# Patient Record
Sex: MALE | Race: OTHER | NOT HISPANIC OR LATINO | URBAN - METROPOLITAN AREA
[De-identification: names, ages, dates, MRNs, and addresses within clinical notes are randomized per-mention and may not be internally consistent; named-entity substitution may affect disease eponyms.]

---

## 2023-12-11 VITALS — WEIGHT: 130.07 LBS

## 2023-12-11 RX ORDER — CHLORHEXIDINE GLUCONATE 213 G/1000ML
1 SOLUTION TOPICAL ONCE
Refills: 0 | Status: DISCONTINUED | OUTPATIENT
Start: 2023-12-13 | End: 2023-12-27

## 2023-12-11 NOTE — H&P ADULT - HISTORY OF PRESENT ILLNESS
Pt is a 58yoM PMHx HTN, HLD who presented to outpatient Cardiologist Dr. Flannery with chief complaint of recurrent recent onset episodes of palpitations described as "his heart fluttering" that has occcured during rest. Symptoms have been persistent over the last few weeks and he also reports exertional shortness of breath at times but otherwise denies any significant chest pain, orthopnea, PND, dizziness, LOC or claudications. Pt currently denies ____ chest pain, SOB, PND, orthopnea, syncope, presyncope, abdominal pain, leg pain and leg swelling. In light of pts risk factors, CCS class __ anginal symptoms and abnormal ____, pt now presents to [facility] for recommended cardiac catheterization with possible intervention if clinically indicated.     Echo: normal LVEF, unremarkable per note  Stress EKG (12/04/23): abnormal maximal exercise stress EKG with evidence of inducible myocardial ischemia, hypertensive response to exercise stress.  Pt is a 58yoM PMHx HTN, HLD who presented to outpatient Cardiologist Dr. Flannery with chief complaint of recurrent recent onset episodes of palpitations described as "his heart fluttering" that has occcured during rest. Symptoms have been persistent over the last few weeks and he also reports exertional shortness of breath at times but otherwise denies any significant chest pain, orthopnea, PND, dizziness, LOC or claudications. Pt currently denies ____ chest pain, SOB, PND, orthopnea, syncope, presyncope, abdominal pain, leg pain and leg swelling. In light of pts risk factors, CCS class __ anginal symptoms and abnormal ____, pt now presents to [facility] for recommended cardiac catheterization with possible intervention if clinically indicated.     Echo: normal LVEF, unremarkable per note  Stress EKG (12/04/23): abnormal maximal exercise stress EKG with evidence of inducible myocardial ischemia, hypertensive response to exercise stress.  Cardiologist: Dr. Flannery  Pharmacy:  [facility]:    Pt is a 58yoM PMHx HTN, HLD who presented to outpatient Cardiologist Dr. Flannery with chief complaint of recurrent recent onset episodes of palpitations described as "his heart fluttering" that has occcured during rest. Symptoms have been persistent over the last few weeks and he also reports exertional shortness of breath at times but otherwise denies any significant chest pain, orthopnea, PND, dizziness, LOC or claudications. Pt currently denies ____ chest pain, SOB, PND, orthopnea, syncope, presyncope, abdominal pain, leg pain and leg swelling. In light of pts risk factors, CCS class __ anginal symptoms and abnormal ____, pt now presents to [facility] for recommended cardiac catheterization with possible intervention if clinically indicated.     Echo: normal LVEF, unremarkable per note  Stress EKG (12/04/23): abnormal maximal exercise stress EKG with evidence of inducible myocardial ischemia, hypertensive response to exercise stress.  Cardiologist: Dr. Flannery  Pharmacy:  [facility]:    Pt is a 58yoM PMHx HTN, HLD who presented to outpatient Cardiologist Dr. Flannery with chief complaint of recurrent recent onset episodes of palpitations described as "his heart fluttering" that has occcured during rest. Symptoms have been persistent over the last few weeks and he also reports exertional shortness of breath at times but otherwise denies any significant chest pain, orthopnea, PND, dizziness, LOC or claudications. Pt currently denies ____ chest pain, SOB, PND, orthopnea, syncope, presyncope, abdominal pain, leg pain and leg swelling. In light of pts risk factors, CCS class __ anginal symptoms and abnormal ____, pt now presents to [facility] for recommended cardiac catheterization with possible intervention if clinically indicated.     Echo: normal LVEF, unremarkable per note  Stress EKG (12/04/23): abnormal maximal exercise stress EKG with evidence of inducible myocardial ischemia, hypertensive response to exercise stress.  Cardiologist: Dr. Flannery  Pharmacy:     Pt is a 58yoM PMHx HTN, HLD who presented to outpatient Cardiologist Dr. Flannery with chief complaint of recurrent recent onset episodes of palpitations described as "his heart fluttering" that has occurred during rest. Symptoms have been persistent over the last few weeks and he also reports exertional shortness of breath at times but otherwise denies any significant chest pain, orthopnea, PND, dizziness, LOC or claudications. Pt currently denies chest pain, SOB, PND, orthopnea, syncope, presyncope, abdominal pain, leg pain and leg swelling. In light of pts risk factors, CCS class II anginal symptoms and abnormal stress test , pt now presents to [facility] for recommended cardiac catheterization with possible intervention if clinically indicated.     Echo: normal LVEF, unremarkable per note  Stress EKG (12/04/23): abnormal maximal exercise stress EKG with evidence of inducible myocardial ischemia, hypertensive response to exercise stress.  Cardiologist: Dr. Flannery  Pharmacy:     Pt is a 58yoM PMHx HTN, HLD who presented to outpatient Cardiologist Dr. Flannery with chief complaint of recurrent recent onset episodes of palpitations described as "his heart fluttering" that has occurred during rest. Symptoms have been persistent over the last few weeks and he also reports exertional shortness of breath at times but otherwise denies any significant chest pain, orthopnea, PND, dizziness, LOC or claudications. Pt currently denies chest pain, SOB, PND, orthopnea, syncope, presyncope, abdominal pain, leg pain and leg swelling. In light of pts risk factors, CCS class II anginal symptoms and abnormal stress test , pt now presents to [facility] for recommended cardiac catheterization with possible intervention if clinically indicated.     Echo: normal LVEF, unremarkable per note  Stress EKG (12/04/23): abnormal maximal exercise stress EKG with evidence of inducible myocardial ischemia, hypertensive response to exercise stress.  Cardiologist: Dr. Solis  Pharmacy: [facility]    Pt is a 58yoM PMHx HTN, HLD who presented to outpatient Cardiologist Dr. Solis with chief complaint of recurrent recent onset episodes of palpitations described as "his heart fluttering" that has occurred during rest. Symptoms have been persistent over the last few weeks and he also reports exertional shortness of breath at times but otherwise denies any significant chest pain, orthopnea, PND, dizziness, LOC or claudications. Pt currently denies chest pain, SOB, PND, orthopnea, syncope, presyncope, abdominal pain, leg pain and leg swelling. In light of pts risk factors, CCS class II anginal symptoms and abnormal stress test , pt now presents to [facility] for recommended cardiac catheterization with possible intervention if clinically indicated.     Echo: normal LVEF, unremarkable per note  Stress EKG (12/04/23): abnormal maximal exercise stress EKG with evidence of inducible myocardial ischemia, hypertensive response to exercise stress.  Cardiologist: Dr. Solis  Pharmacy: [facility]    Pt is a 58yoM PMHx HTN, HLD who presented to outpatient Cardiologist Dr. Solis with chief complaint of recurrent recent onset episodes of palpitations described as "his heart fluttering" that has occurred during rest. Symptoms have been persistent over the last few weeks and he also reports exertional shortness of breath at times but otherwise denies any significant chest pain, orthopnea, PND, dizziness, LOC or claudications. Pt currently denies chest pain, SOB, PND, orthopnea, syncope, presyncope, abdominal pain, leg pain and leg swelling. In light of pts risk factors, CCS class II anginal symptoms and abnormal stress test , pt now presents to [facility] for recommended cardiac catheterization with possible intervention if clinically indicated.     Echo: normal LVEF, unremarkable per note  Stress EKG (12/04/23): abnormal maximal exercise stress EKG with evidence of inducible myocardial ischemia, hypertensive response to exercise stress.

## 2023-12-11 NOTE — H&P ADULT - ASSESSMENT
Pt is a 58yoM PMHx HTN, HLD who presented to outpatient Cardiologist Dr. Solis with chief complaint of recurrent recent onset episodes of palpitations described as "his heart fluttering" that has occurred during rest. Symptoms have been persistent over the last few weeks and he also reports exertional shortness of breath at times but otherwise denies any significant chest pain, orthopnea, PND, dizziness, LOC or claudications. Pt currently denies chest pain, SOB, PND, orthopnea, syncope, presyncope, abdominal pain, leg pain and leg swelling. In light of pts risk factors, CCS class II anginal symptoms and abnormal stress test , pt now presents to [facility] for recommended cardiac catheterization with possible intervention if clinically indicated.   Echo: normal LVEF, unremarkable per note  Stress EKG (12/04/23): abnormal maximal exercise stress EKG with evidence of inducible myocardial ischemia, hypertensive response to exercise stress.     - ASA II; Mallampati I.   - VSS.   - Pt is a candidate for moderate sedation.   - LOAD: ASA 325mg PO x1 and Plavix 600mg PO x1;   - IVF: NS 250cc IV bolus x2 then NS 75cc/hr x2hrs.     Risks & benefits of procedure and alternative therapy have been explained to the patient including but not limited to: allergic reaction, bleeding w/possible need for blood transfusion, infection, renal and vascular compromise, limb damage, arrhythmia, stroke, vessel dissection/perforation, Myocardial infarction, emergent CABG. Informed consent obtained and in chart.    Pt is a 58yoM PMHx HTN, HLD who presented to outpatient Cardiologist Dr. Solis with chief complaint of recurrent recent onset episodes of palpitations described as "his heart fluttering" that has occurred during rest. Symptoms have been persistent over the last few weeks and he also reports exertional shortness of breath at times but otherwise denies any significant chest pain, orthopnea, PND, dizziness, LOC or claudications. Pt currently denies chest pain, SOB, PND, orthopnea, syncope, presyncope, abdominal pain, leg pain and leg swelling. In light of pts risk factors, CCS class II anginal symptoms and abnormal stress test , pt now presents to [facility] for recommended cardiac catheterization with possible intervention if clinically indicated.   Echo: normal LVEF, unremarkable per note  Stress EKG (12/04/23): abnormal maximal exercise stress EKG with evidence of inducible myocardial ischemia, hypertensive response to exercise stress.     - ASA II; Mallampati I.   - VSS.   - Pt is a candidate for moderate sedation.   - LOAD: ASA 325mg PO x1 and Plavix 600mg PO x1;   - IVF: NS 250cc IV bolus x2 then NS 75cc/hr x2hrs.     Risks & benefits of procedure and alternative therapy have been explained to the patient including but not limited to: allergic reaction, bleeding w/possible need for blood transfusion, infection, renal and vascular compromise, limb damage, arrhythmia, stroke, vessel dissection/perforation, Myocardial infarction, emergent CABG. Informed consent obtained and in chart.

## 2023-12-11 NOTE — H&P ADULT - NSHPLABSRESULTS_GEN_ALL_CORE
14.3   7.77  )-----------( 276      ( 13 Dec 2023 07:07 )             42.2     Mg     2.2     12-13    PT/INR - ( 13 Dec 2023 07:07 )   PT: 10.4 sec;   INR: 0.91        PTT - ( 13 Dec 2023 07:07 )  PTT:29.3 sec    EKG: NSR with no ischemic changes

## 2023-12-13 ENCOUNTER — OUTPATIENT (OUTPATIENT)
Dept: OUTPATIENT SERVICES | Facility: HOSPITAL | Age: 58
LOS: 1 days | Discharge: HOME | End: 2023-12-13
Payer: MEDICAID

## 2023-12-13 LAB
A1C WITH ESTIMATED AVERAGE GLUCOSE RESULT: 5.7 % — HIGH (ref 4–5.6)
A1C WITH ESTIMATED AVERAGE GLUCOSE RESULT: 5.7 % — HIGH (ref 4–5.6)
ALBUMIN SERPL ELPH-MCNC: 4.8 G/DL — SIGNIFICANT CHANGE UP (ref 3.3–5)
ALBUMIN SERPL ELPH-MCNC: 4.8 G/DL — SIGNIFICANT CHANGE UP (ref 3.3–5)
ALP SERPL-CCNC: 82 U/L — SIGNIFICANT CHANGE UP (ref 40–120)
ALP SERPL-CCNC: 82 U/L — SIGNIFICANT CHANGE UP (ref 40–120)
ALT FLD-CCNC: 21 U/L — SIGNIFICANT CHANGE UP (ref 10–45)
ALT FLD-CCNC: 21 U/L — SIGNIFICANT CHANGE UP (ref 10–45)
ANION GAP SERPL CALC-SCNC: 12 MMOL/L — SIGNIFICANT CHANGE UP (ref 5–17)
ANION GAP SERPL CALC-SCNC: 12 MMOL/L — SIGNIFICANT CHANGE UP (ref 5–17)
APTT BLD: 29.3 SEC — SIGNIFICANT CHANGE UP (ref 24.5–35.6)
APTT BLD: 29.3 SEC — SIGNIFICANT CHANGE UP (ref 24.5–35.6)
AST SERPL-CCNC: 22 U/L — SIGNIFICANT CHANGE UP (ref 10–40)
AST SERPL-CCNC: 22 U/L — SIGNIFICANT CHANGE UP (ref 10–40)
BASE EXCESS BLDV CALC-SCNC: 3.1 MMOL/L — HIGH (ref -2–3)
BASE EXCESS BLDV CALC-SCNC: 3.1 MMOL/L — HIGH (ref -2–3)
BASOPHILS # BLD AUTO: 0.05 K/UL — SIGNIFICANT CHANGE UP (ref 0–0.2)
BASOPHILS # BLD AUTO: 0.05 K/UL — SIGNIFICANT CHANGE UP (ref 0–0.2)
BASOPHILS NFR BLD AUTO: 0.6 % — SIGNIFICANT CHANGE UP (ref 0–2)
BASOPHILS NFR BLD AUTO: 0.6 % — SIGNIFICANT CHANGE UP (ref 0–2)
BILIRUB SERPL-MCNC: 0.4 MG/DL — SIGNIFICANT CHANGE UP (ref 0.2–1.2)
BILIRUB SERPL-MCNC: 0.4 MG/DL — SIGNIFICANT CHANGE UP (ref 0.2–1.2)
BLOOD GAS VENOUS - BLOOD UREA NITROGEN: 20 MG/DL — SIGNIFICANT CHANGE UP (ref 7–23)
BLOOD GAS VENOUS - BLOOD UREA NITROGEN: 20 MG/DL — SIGNIFICANT CHANGE UP (ref 7–23)
BLOOD GAS VENOUS - CREATININE: 1.6 MG/DL — HIGH (ref 0.5–1.3)
BLOOD GAS VENOUS - CREATININE: 1.6 MG/DL — HIGH (ref 0.5–1.3)
BUN SERPL-MCNC: 17 MG/DL — SIGNIFICANT CHANGE UP (ref 7–23)
BUN SERPL-MCNC: 17 MG/DL — SIGNIFICANT CHANGE UP (ref 7–23)
CA-I SERPL-SCNC: 1.19 MMOL/L — SIGNIFICANT CHANGE UP (ref 1.15–1.33)
CA-I SERPL-SCNC: 1.19 MMOL/L — SIGNIFICANT CHANGE UP (ref 1.15–1.33)
CA-I SERPL-SCNC: 1.2 MMOL/L — SIGNIFICANT CHANGE UP (ref 1.15–1.33)
CA-I SERPL-SCNC: 1.2 MMOL/L — SIGNIFICANT CHANGE UP (ref 1.15–1.33)
CALCIUM SERPL-MCNC: 9.9 MG/DL — SIGNIFICANT CHANGE UP (ref 8.4–10.5)
CALCIUM SERPL-MCNC: 9.9 MG/DL — SIGNIFICANT CHANGE UP (ref 8.4–10.5)
CHLORIDE BLDV-SCNC: 107 MMOL/L — SIGNIFICANT CHANGE UP (ref 96–108)
CHLORIDE BLDV-SCNC: 107 MMOL/L — SIGNIFICANT CHANGE UP (ref 96–108)
CHLORIDE SERPL-SCNC: 102 MMOL/L — SIGNIFICANT CHANGE UP (ref 96–108)
CHLORIDE SERPL-SCNC: 102 MMOL/L — SIGNIFICANT CHANGE UP (ref 96–108)
CHOLEST SERPL-MCNC: 287 MG/DL — HIGH
CHOLEST SERPL-MCNC: 287 MG/DL — HIGH
CO2 BLDV-SCNC: 26.1 MMOL/L — HIGH (ref 22–26)
CO2 BLDV-SCNC: 26.1 MMOL/L — HIGH (ref 22–26)
CO2 BLDV-SCNC: 31.1 MMOL/L — HIGH (ref 22–26)
CO2 BLDV-SCNC: 31.1 MMOL/L — HIGH (ref 22–26)
CO2 SERPL-SCNC: 27 MMOL/L — SIGNIFICANT CHANGE UP (ref 22–31)
CO2 SERPL-SCNC: 27 MMOL/L — SIGNIFICANT CHANGE UP (ref 22–31)
CREAT SERPL-MCNC: 1.4 MG/DL — HIGH (ref 0.5–1.3)
CREAT SERPL-MCNC: 1.4 MG/DL — HIGH (ref 0.5–1.3)
EGFR: 58 ML/MIN/1.73M2 — LOW
EGFR: 58 ML/MIN/1.73M2 — LOW
EOSINOPHIL # BLD AUTO: 0.16 K/UL — SIGNIFICANT CHANGE UP (ref 0–0.5)
EOSINOPHIL # BLD AUTO: 0.16 K/UL — SIGNIFICANT CHANGE UP (ref 0–0.5)
EOSINOPHIL NFR BLD AUTO: 2.1 % — SIGNIFICANT CHANGE UP (ref 0–6)
EOSINOPHIL NFR BLD AUTO: 2.1 % — SIGNIFICANT CHANGE UP (ref 0–6)
ESTIMATED AVERAGE GLUCOSE: 117 MG/DL — HIGH (ref 68–114)
ESTIMATED AVERAGE GLUCOSE: 117 MG/DL — HIGH (ref 68–114)
GAS PNL BLDV: 139 MMOL/L — SIGNIFICANT CHANGE UP (ref 136–145)
GAS PNL BLDV: 139 MMOL/L — SIGNIFICANT CHANGE UP (ref 136–145)
GAS PNL BLDV: 140 MMOL/L — SIGNIFICANT CHANGE UP (ref 136–145)
GAS PNL BLDV: 140 MMOL/L — SIGNIFICANT CHANGE UP (ref 136–145)
GLUCOSE BLDV-MCNC: 103 MG/DL — HIGH (ref 70–99)
GLUCOSE SERPL-MCNC: 106 MG/DL — HIGH (ref 70–99)
GLUCOSE SERPL-MCNC: 106 MG/DL — HIGH (ref 70–99)
HCO3 BLDV-SCNC: 30 MMOL/L — HIGH (ref 22–29)
HCO3 BLDV-SCNC: 30 MMOL/L — HIGH (ref 22–29)
HCT VFR BLD CALC: 42.2 % — SIGNIFICANT CHANGE UP (ref 39–50)
HCT VFR BLD CALC: 42.2 % — SIGNIFICANT CHANGE UP (ref 39–50)
HCT VFR BLDA CALC: 43 % — SIGNIFICANT CHANGE UP
HCT VFR BLDA CALC: 43 % — SIGNIFICANT CHANGE UP
HDLC SERPL-MCNC: 59 MG/DL — SIGNIFICANT CHANGE UP
HDLC SERPL-MCNC: 59 MG/DL — SIGNIFICANT CHANGE UP
HGB BLD-MCNC: 14.3 G/DL — SIGNIFICANT CHANGE UP (ref 13–17)
HGB BLD-MCNC: 14.3 G/DL — SIGNIFICANT CHANGE UP (ref 13–17)
IMM GRANULOCYTES NFR BLD AUTO: 0.5 % — SIGNIFICANT CHANGE UP (ref 0–0.9)
IMM GRANULOCYTES NFR BLD AUTO: 0.5 % — SIGNIFICANT CHANGE UP (ref 0–0.9)
INR BLD: 0.91 — SIGNIFICANT CHANGE UP (ref 0.85–1.18)
INR BLD: 0.91 — SIGNIFICANT CHANGE UP (ref 0.85–1.18)
LACTATE BLDV-MCNC: 1.2 MMOL/L — SIGNIFICANT CHANGE UP (ref 0.5–2)
LACTATE BLDV-MCNC: 1.2 MMOL/L — SIGNIFICANT CHANGE UP (ref 0.5–2)
LIPID PNL WITH DIRECT LDL SERPL: 202 MG/DL — HIGH
LIPID PNL WITH DIRECT LDL SERPL: 202 MG/DL — HIGH
LYMPHOCYTES # BLD AUTO: 2.35 K/UL — SIGNIFICANT CHANGE UP (ref 1–3.3)
LYMPHOCYTES # BLD AUTO: 2.35 K/UL — SIGNIFICANT CHANGE UP (ref 1–3.3)
LYMPHOCYTES # BLD AUTO: 30.2 % — SIGNIFICANT CHANGE UP (ref 13–44)
LYMPHOCYTES # BLD AUTO: 30.2 % — SIGNIFICANT CHANGE UP (ref 13–44)
MAGNESIUM SERPL-MCNC: 2.2 MG/DL — SIGNIFICANT CHANGE UP (ref 1.6–2.6)
MAGNESIUM SERPL-MCNC: 2.2 MG/DL — SIGNIFICANT CHANGE UP (ref 1.6–2.6)
MCHC RBC-ENTMCNC: 29.7 PG — SIGNIFICANT CHANGE UP (ref 27–34)
MCHC RBC-ENTMCNC: 29.7 PG — SIGNIFICANT CHANGE UP (ref 27–34)
MCHC RBC-ENTMCNC: 33.9 GM/DL — SIGNIFICANT CHANGE UP (ref 32–36)
MCHC RBC-ENTMCNC: 33.9 GM/DL — SIGNIFICANT CHANGE UP (ref 32–36)
MCV RBC AUTO: 87.7 FL — SIGNIFICANT CHANGE UP (ref 80–100)
MCV RBC AUTO: 87.7 FL — SIGNIFICANT CHANGE UP (ref 80–100)
MONOCYTES # BLD AUTO: 0.53 K/UL — SIGNIFICANT CHANGE UP (ref 0–0.9)
MONOCYTES # BLD AUTO: 0.53 K/UL — SIGNIFICANT CHANGE UP (ref 0–0.9)
MONOCYTES NFR BLD AUTO: 6.8 % — SIGNIFICANT CHANGE UP (ref 2–14)
MONOCYTES NFR BLD AUTO: 6.8 % — SIGNIFICANT CHANGE UP (ref 2–14)
NEUTROPHILS # BLD AUTO: 4.64 K/UL — SIGNIFICANT CHANGE UP (ref 1.8–7.4)
NEUTROPHILS # BLD AUTO: 4.64 K/UL — SIGNIFICANT CHANGE UP (ref 1.8–7.4)
NEUTROPHILS NFR BLD AUTO: 59.8 % — SIGNIFICANT CHANGE UP (ref 43–77)
NEUTROPHILS NFR BLD AUTO: 59.8 % — SIGNIFICANT CHANGE UP (ref 43–77)
NON HDL CHOLESTEROL: 228 MG/DL — HIGH
NON HDL CHOLESTEROL: 228 MG/DL — HIGH
NRBC # BLD: 0 /100 WBCS — SIGNIFICANT CHANGE UP (ref 0–0)
NRBC # BLD: 0 /100 WBCS — SIGNIFICANT CHANGE UP (ref 0–0)
PCO2 BLDV: 51 MMHG — SIGNIFICANT CHANGE UP (ref 42–55)
PCO2 BLDV: 51 MMHG — SIGNIFICANT CHANGE UP (ref 42–55)
PCO2 BLDV: 52 MMHG — SIGNIFICANT CHANGE UP (ref 42–55)
PCO2 BLDV: 52 MMHG — SIGNIFICANT CHANGE UP (ref 42–55)
PH BLDV: 7.37 — SIGNIFICANT CHANGE UP (ref 7.32–7.43)
PLATELET # BLD AUTO: 276 K/UL — SIGNIFICANT CHANGE UP (ref 150–400)
PLATELET # BLD AUTO: 276 K/UL — SIGNIFICANT CHANGE UP (ref 150–400)
PO2 BLDV: 42 MMHG — SIGNIFICANT CHANGE UP (ref 25–45)
PO2 BLDV: 42 MMHG — SIGNIFICANT CHANGE UP (ref 25–45)
POTASSIUM BLDV-SCNC: 4.4 MMOL/L — SIGNIFICANT CHANGE UP (ref 3.5–5.1)
POTASSIUM BLDV-SCNC: 4.4 MMOL/L — SIGNIFICANT CHANGE UP (ref 3.5–5.1)
POTASSIUM BLDV-SCNC: 4.6 MMOL/L — SIGNIFICANT CHANGE UP (ref 3.5–5.1)
POTASSIUM BLDV-SCNC: 4.6 MMOL/L — SIGNIFICANT CHANGE UP (ref 3.5–5.1)
POTASSIUM SERPL-MCNC: 4.1 MMOL/L — SIGNIFICANT CHANGE UP (ref 3.5–5.3)
POTASSIUM SERPL-MCNC: 4.1 MMOL/L — SIGNIFICANT CHANGE UP (ref 3.5–5.3)
POTASSIUM SERPL-SCNC: 4.1 MMOL/L — SIGNIFICANT CHANGE UP (ref 3.5–5.3)
POTASSIUM SERPL-SCNC: 4.1 MMOL/L — SIGNIFICANT CHANGE UP (ref 3.5–5.3)
PROT SERPL-MCNC: 8.2 G/DL — SIGNIFICANT CHANGE UP (ref 6–8.3)
PROT SERPL-MCNC: 8.2 G/DL — SIGNIFICANT CHANGE UP (ref 6–8.3)
PROTHROM AB SERPL-ACNC: 10.4 SEC — SIGNIFICANT CHANGE UP (ref 9.5–13)
PROTHROM AB SERPL-ACNC: 10.4 SEC — SIGNIFICANT CHANGE UP (ref 9.5–13)
RBC # BLD: 4.81 M/UL — SIGNIFICANT CHANGE UP (ref 4.2–5.8)
RBC # BLD: 4.81 M/UL — SIGNIFICANT CHANGE UP (ref 4.2–5.8)
RBC # FLD: 11.9 % — SIGNIFICANT CHANGE UP (ref 10.3–14.5)
RBC # FLD: 11.9 % — SIGNIFICANT CHANGE UP (ref 10.3–14.5)
SODIUM SERPL-SCNC: 141 MMOL/L — SIGNIFICANT CHANGE UP (ref 135–145)
SODIUM SERPL-SCNC: 141 MMOL/L — SIGNIFICANT CHANGE UP (ref 135–145)
TRIGL SERPL-MCNC: 128 MG/DL — SIGNIFICANT CHANGE UP
TRIGL SERPL-MCNC: 128 MG/DL — SIGNIFICANT CHANGE UP
WBC # BLD: 7.77 K/UL — SIGNIFICANT CHANGE UP (ref 3.8–10.5)
WBC # BLD: 7.77 K/UL — SIGNIFICANT CHANGE UP (ref 3.8–10.5)
WBC # FLD AUTO: 7.77 K/UL — SIGNIFICANT CHANGE UP (ref 3.8–10.5)
WBC # FLD AUTO: 7.77 K/UL — SIGNIFICANT CHANGE UP (ref 3.8–10.5)

## 2023-12-13 PROCEDURE — 85610 PROTHROMBIN TIME: CPT

## 2023-12-13 PROCEDURE — 80061 LIPID PANEL: CPT

## 2023-12-13 PROCEDURE — C1769: CPT

## 2023-12-13 PROCEDURE — C1894: CPT

## 2023-12-13 PROCEDURE — 85025 COMPLETE CBC W/AUTO DIFF WBC: CPT

## 2023-12-13 PROCEDURE — 99152 MOD SED SAME PHYS/QHP 5/>YRS: CPT

## 2023-12-13 PROCEDURE — 93010 ELECTROCARDIOGRAM REPORT: CPT

## 2023-12-13 PROCEDURE — 80053 COMPREHEN METABOLIC PANEL: CPT

## 2023-12-13 PROCEDURE — 93005 ELECTROCARDIOGRAM TRACING: CPT

## 2023-12-13 PROCEDURE — 83735 ASSAY OF MAGNESIUM: CPT

## 2023-12-13 PROCEDURE — 85730 THROMBOPLASTIN TIME PARTIAL: CPT

## 2023-12-13 PROCEDURE — 83036 HEMOGLOBIN GLYCOSYLATED A1C: CPT

## 2023-12-13 PROCEDURE — C1887: CPT

## 2023-12-13 PROCEDURE — 36415 COLL VENOUS BLD VENIPUNCTURE: CPT

## 2023-12-13 PROCEDURE — 93458 L HRT ARTERY/VENTRICLE ANGIO: CPT

## 2023-12-13 PROCEDURE — 93458 L HRT ARTERY/VENTRICLE ANGIO: CPT | Mod: 26

## 2023-12-13 RX ORDER — ROSUVASTATIN CALCIUM 5 MG/1
1 TABLET ORAL
Refills: 0 | DISCHARGE

## 2023-12-13 RX ORDER — SODIUM CHLORIDE 9 MG/ML
250 INJECTION INTRAMUSCULAR; INTRAVENOUS; SUBCUTANEOUS ONCE
Refills: 0 | Status: DISCONTINUED | OUTPATIENT
Start: 2023-12-13 | End: 2023-12-13

## 2023-12-13 RX ORDER — ASPIRIN/CALCIUM CARB/MAGNESIUM 324 MG
1 TABLET ORAL
Refills: 0 | DISCHARGE

## 2023-12-13 RX ORDER — SODIUM CHLORIDE 9 MG/ML
500 INJECTION INTRAMUSCULAR; INTRAVENOUS; SUBCUTANEOUS
Refills: 0 | Status: DISCONTINUED | OUTPATIENT
Start: 2023-12-13 | End: 2023-12-27

## 2023-12-13 RX ORDER — AMLODIPINE BESYLATE 2.5 MG/1
1 TABLET ORAL
Refills: 0 | DISCHARGE

## 2023-12-13 RX ORDER — CLOPIDOGREL BISULFATE 75 MG/1
600 TABLET, FILM COATED ORAL ONCE
Refills: 0 | Status: COMPLETED | OUTPATIENT
Start: 2023-12-13 | End: 2023-12-13

## 2023-12-13 RX ORDER — SODIUM CHLORIDE 9 MG/ML
500 INJECTION INTRAMUSCULAR; INTRAVENOUS; SUBCUTANEOUS
Refills: 0 | Status: DISCONTINUED | OUTPATIENT
Start: 2023-12-13 | End: 2023-12-13

## 2023-12-13 RX ORDER — ASPIRIN/CALCIUM CARB/MAGNESIUM 324 MG
325 TABLET ORAL ONCE
Refills: 0 | Status: COMPLETED | OUTPATIENT
Start: 2023-12-13 | End: 2023-12-13

## 2023-12-13 RX ORDER — SODIUM CHLORIDE 9 MG/ML
250 INJECTION INTRAMUSCULAR; INTRAVENOUS; SUBCUTANEOUS ONCE
Refills: 0 | Status: COMPLETED | OUTPATIENT
Start: 2023-12-13 | End: 2023-12-13

## 2023-12-13 RX ADMIN — CLOPIDOGREL BISULFATE 600 MILLIGRAM(S): 75 TABLET, FILM COATED ORAL at 07:48

## 2023-12-13 RX ADMIN — Medication 325 MILLIGRAM(S): at 07:47

## 2023-12-13 RX ADMIN — SODIUM CHLORIDE 500 MILLILITER(S): 9 INJECTION INTRAMUSCULAR; INTRAVENOUS; SUBCUTANEOUS at 07:45

## 2023-12-13 RX ADMIN — SODIUM CHLORIDE 75 MILLILITER(S): 9 INJECTION INTRAMUSCULAR; INTRAVENOUS; SUBCUTANEOUS at 07:47

## 2023-12-13 NOTE — PROGRESS NOTE ADULT - SUBJECTIVE AND OBJECTIVE BOX
INTERVENTIONAL CARDIOLOGY [facility]ERYL [facility]ONE[facility] DISCHARGE NOTE    Patient without complaints. Ambulated and voided without difficulties    Afebrile, VSS    PHYSICAL EXAM:    Ext - Rt Radial: Hemostasis achieved with manual release of Hemoband. Dressing C/D/I -- no oozing, bleeding, or hematoma noted. Sensations intact with 2+ pulses  		    MEDICATIONS:  ·	ASA 81mg QD  ·	Rosuvastatin 20mg QHS         A/P:      58M with hx of HTN and HLD, who presented to outpatient Cardiologist Dr. Solis with chief complaint of recurrent recent onset episodes of palpitations described as "his heart fluttering" that has occurred during rest. In light of pts risk factors, CCS class II anginal symptoms and abnormal stress test , pt now presents to [facility] for recommended cardiac catheterization with possible intervention if clinically indicated.       - Stable for discharge as per attending Dr. Solis after bed rest, pt voids, groin/wrist stable and 30 minutes of ambulation.  -  and already on high dose Rosuvastatin -- Dr. Solis aware and will just follow-up as outpatient   - Follow-up with PMD/Cardiologist Dr. Solis in 1-2 weeks  - Discharged forms signed and copies in chart      INTERVENTIONAL CARDIOLOGY CHERYL [facility]ONEA DISCHARGE NOTE    Patient without complaints. Ambulated and voided without difficulties    Afebrile, VSS    PHYSICAL EXAM:    Ext - Rt Radial: Hemostasis achieved with manual release of Hemoband. Dressing C/D/I -- no oozing, bleeding, or hematoma noted. Sensations intact with 2+ pulses  		    MEDICATIONS:  ·	ASA 81mg QD  ·	Rosuvastatin 20mg QHS         A/P:      58M with hx of HTN and HLD, who presented to outpatient Cardiologist Dr. Solis with chief complaint of recurrent recent onset episodes of palpitations described as "his heart fluttering" that has occurred during rest. In light of pts risk factors, CCS class II anginal symptoms and abnormal stress test , pt now presents to [facility] for recommended cardiac catheterization with possible intervention if clinically indicated.       - Stable for discharge as per attending Dr. Solis after bed rest, pt voids, groin/wrist stable and 30 minutes of ambulation.  -  and already on high dose Rosuvastatin -- Dr. Solis aware and will just follow-up as outpatient   - Follow-up with PMD/Cardiologist Dr. Solis in 1-2 weeks  - Discharged forms signed and copies in chart      INTERVENTIONAL CARDIOLOGY PA SDA DISCHARGE NOTE    Patient without complaints. Ambulated and voided without difficulties    Afebrile, VSS    PHYSICAL EXAM:    Ext - Rt Radial: Hemostasis achieved with manual release of Hemoband. Dressing C/D/I -- no oozing, bleeding, or hematoma noted. Sensations intact with 2+ pulses  		    MEDICATIONS:  ·	ASA 81mg QD  ·	Rosuvastatin 20mg QHS         A/P:      58M with hx of HTN and HLD, who presented to outpatient Cardiologist Dr. Solis with chief complaint of recurrent recent onset episodes of palpitations described as "his heart fluttering" that has occurred during rest. In light of pts risk factors, CCS class II anginal symptoms and abnormal stress test , pt now presents to [facility] for recommended cardiac catheterization with possible intervention if clinically indicated.     Pt is now s/p LHC (12/13/23): LM, LAD, and LCx with MLI; RCA __    - Stable for discharge as per attending Dr. Solis after bed rest, pt voids, groin/wrist stable and 30 minutes of ambulation.  -  and already on high dose Rosuvastatin -- Dr. Solis aware and will just follow-up as outpatient   - Follow-up with PMD/Cardiologist Dr. Solis in 1-2 weeks  - Discharged forms signed and copies in chart      INTERVENTIONAL CARDIOLOGY PA SDA DISCHARGE NOTE    Patient without complaints. Ambulated and voided without difficulties    Afebrile, VSS    PHYSICAL EXAM:    Ext - Rt Radial: Hemostasis achieved with manual release of Hemoband. Dressing C/D/I -- no oozing, bleeding, or hematoma noted. Sensations intact with 2+ pulses  		    MEDICATIONS:  ·	ASA 81mg QD  ·	Rosuvastatin 20mg QHS         A/P:      58M with hx of HTN and HLD, who presented to outpatient Cardiologist Dr. Solis with chief complaint of recurrent recent onset episodes of palpitations described as "his heart fluttering" that has occurred during rest. In light of pts risk factors, CCS class II anginal symptoms and abnormal stress test , pt now presents to [facility] for recommended cardiac catheterization with possible intervention if clinically indicated.     Pt is now s/p LHC (12/13/23): LM, LAD, and LCx with MLI; RCA __ -- via RRA [IC: Dr. GHASSAN Solis]      - Stable for discharge as per attending Dr. Solis after bed rest, pt voids, groin/wrist stable and 30 minutes of ambulation.  -  and already on high dose Rosuvastatin -- Dr. Solis aware and will just follow-up as outpatient   - Follow-up with PMD/Cardiologist Dr. Solis in 1-2 weeks  - Discharged forms signed and copies in chart      INTERVENTIONAL CARDIOLOGY PA SDA DISCHARGE NOTE    Patient without complaints. Ambulated and voided without difficulties    Afebrile, VSS    PHYSICAL EXAM:    Ext - Rt Radial: Hemostasis achieved with manual release of Hemoband. Dressing C/D/I -- no oozing, bleeding, or hematoma noted. Sensations intact with 2+ pulses  		    MEDICATIONS:  ·	ASA 81mg QD  ·	Rosuvastatin 20mg QHS         A/P:      58M with hx of HTN and HLD, who presented to outpatient Cardiologist Dr. Solis with chief complaint of recurrent recent onset episodes of palpitations described as "his heart fluttering" that has occurred during rest. In light of pts risk factors, CCS class II anginal symptoms and abnormal stress test , pt now presents to [facility] for recommended cardiac catheterization with possible intervention if clinically indicated.     Pt is now s/p LHC (12/13/23): LM, LAD, and LCx with MLI; RCA __ -- via RRA [IC: Dr. GHASSAN Solis]      - Stable for discharge as per attending Dr. Solis after bed rest, pt voids, groin/wrist stable and 30 minutes of ambulation.  -  and already on high dose Rosuvastatin -- Dr. Solis aware and will just follow-up as outpatient   - Follow-up with PMD/Cardiologist Dr. Solis in 1-2 weeks  - Discharged forms signed and copies in chart      INTERVENTIONAL CARDIOLOGY PA SDA DISCHARGE NOTE    Patient without complaints. Ambulated and voided without difficulties    Afebrile, VSS    PHYSICAL EXAM:    Ext - Rt Radial: Hemostasis achieved with manual release of Hemoband. Dressing C/D/I -- no oozing, bleeding, or hematoma noted. Sensations intact with 2+ pulses  		    MEDICATIONS:  ·	ASA 81mg QD  ·	Rosuvastatin 20mg QHS   ·	Amlodipine 5mg QD        A/P:      58M with hx of HTN and HLD, who presented to outpatient Cardiologist Dr. Solis with chief complaint of recurrent recent onset episodes of palpitations described as "his heart fluttering" that has occurred during rest. In light of pts risk factors, CCS class II anginal symptoms and abnormal stress test , pt now presents to [facility] for recommended cardiac catheterization with possible intervention if clinically indicated.     Pt is now s/p LHC (12/13/23): LM, LAD, and LCx with MLI; RCA non-dominant -- via RRA [IC: Dr. GHASSAN Solis]      - Stable for discharge as per attending Dr. Solsi after bed rest, pt voids, groin/wrist stable and 30 minutes of ambulation.  -  and already on high dose Rosuvastatin -- Dr. Solis aware and will just follow-up as outpatient   - Follow-up with PMD/Cardiologist Dr. Solis in 1-2 weeks  - Discharged forms signed and copies in chart      INTERVENTIONAL CARDIOLOGY PA SDA DISCHARGE NOTE    Patient without complaints. Ambulated and voided without difficulties    Afebrile, VSS    PHYSICAL EXAM:    Ext - Rt Radial: Hemostasis achieved with manual release of Hemoband. Dressing C/D/I -- no oozing, bleeding, or hematoma noted. Sensations intact with 2+ pulses  		    MEDICATIONS:  ·	ASA 81mg QD  ·	Rosuvastatin 20mg QHS   ·	Amlodipine 5mg QD        A/P:      58M with hx of HTN and HLD, who presented to outpatient Cardiologist Dr. Solis with chief complaint of recurrent recent onset episodes of palpitations described as "his heart fluttering" that has occurred during rest. In light of pts risk factors, CCS class II anginal symptoms and abnormal stress test , pt now presents to [facility] for recommended cardiac catheterization with possible intervention if clinically indicated.     Pt is now s/p LHC (12/13/23): LM, LAD, and LCx with MLI; RCA non-dominant -- via RRA [IC: Dr. GHASSAN Solis]      - Stable for discharge as per attending Dr. Solis after bed rest, pt voids, groin/wrist stable and 30 minutes of ambulation.  -  and already on high dose Rosuvastatin -- Dr. Solis aware and will just follow-up as outpatient   - Follow-up with PMD/Cardiologist Dr. Solis in 1-2 weeks  - Discharged forms signed and copies in chart

## 2023-12-15 DIAGNOSIS — R94.39 ABNORMAL RESULT OF OTHER CARDIOVASCULAR FUNCTION STUDY: ICD-10-CM

## 2023-12-15 DIAGNOSIS — I20.0 UNSTABLE ANGINA: ICD-10-CM

## 2023-12-15 LAB
ISTAT INR: 1 — SIGNIFICANT CHANGE UP (ref 0.88–1.16)
ISTAT INR: 1 — SIGNIFICANT CHANGE UP (ref 0.88–1.16)
ISTAT PT: 12.2 SEC — SIGNIFICANT CHANGE UP (ref 10–12.9)
ISTAT PT: 12.2 SEC — SIGNIFICANT CHANGE UP (ref 10–12.9)